# Patient Record
Sex: FEMALE | Race: OTHER | NOT HISPANIC OR LATINO | URBAN - METROPOLITAN AREA
[De-identification: names, ages, dates, MRNs, and addresses within clinical notes are randomized per-mention and may not be internally consistent; named-entity substitution may affect disease eponyms.]

---

## 2019-03-28 ENCOUNTER — OUTPATIENT (OUTPATIENT)
Dept: OUTPATIENT SERVICES | Facility: HOSPITAL | Age: 34
LOS: 1 days | End: 2019-03-28
Payer: COMMERCIAL

## 2019-03-28 VITALS
OXYGEN SATURATION: 100 % | WEIGHT: 271.61 LBS | HEIGHT: 65 IN | HEART RATE: 90 BPM | SYSTOLIC BLOOD PRESSURE: 138 MMHG | RESPIRATION RATE: 16 BRPM | TEMPERATURE: 98 F | DIASTOLIC BLOOD PRESSURE: 79 MMHG

## 2019-03-28 DIAGNOSIS — Z98.890 OTHER SPECIFIED POSTPROCEDURAL STATES: Chronic | ICD-10-CM

## 2019-03-28 DIAGNOSIS — Z01.818 ENCOUNTER FOR OTHER PREPROCEDURAL EXAMINATION: ICD-10-CM

## 2019-03-28 DIAGNOSIS — M77.9 ENTHESOPATHY, UNSPECIFIED: ICD-10-CM

## 2019-03-28 DIAGNOSIS — Z98.84 BARIATRIC SURGERY STATUS: Chronic | ICD-10-CM

## 2019-03-28 DIAGNOSIS — M79.609 PAIN IN UNSPECIFIED LIMB: ICD-10-CM

## 2019-03-28 DIAGNOSIS — Z98.89 OTHER SPECIFIED POSTPROCEDURAL STATES: Chronic | ICD-10-CM

## 2019-03-28 DIAGNOSIS — Z90.49 ACQUIRED ABSENCE OF OTHER SPECIFIED PARTS OF DIGESTIVE TRACT: Chronic | ICD-10-CM

## 2019-03-28 LAB
ANION GAP SERPL CALC-SCNC: 8 MMOL/L — SIGNIFICANT CHANGE UP (ref 5–17)
BUN SERPL-MCNC: 10 MG/DL — SIGNIFICANT CHANGE UP (ref 7–23)
CALCIUM SERPL-MCNC: 9.2 MG/DL — SIGNIFICANT CHANGE UP (ref 8.4–10.5)
CHLORIDE SERPL-SCNC: 102 MMOL/L — SIGNIFICANT CHANGE UP (ref 96–108)
CO2 SERPL-SCNC: 28 MMOL/L — SIGNIFICANT CHANGE UP (ref 22–31)
CREAT SERPL-MCNC: 0.6 MG/DL — SIGNIFICANT CHANGE UP (ref 0.5–1.3)
GLUCOSE SERPL-MCNC: 207 MG/DL — HIGH (ref 70–99)
HCT VFR BLD CALC: 28.7 % — LOW (ref 34.5–45)
HGB BLD-MCNC: 7.8 G/DL — LOW (ref 11.5–15.5)
MCHC RBC-ENTMCNC: 16.2 PG — LOW (ref 27–34)
MCHC RBC-ENTMCNC: 27 GM/DL — LOW (ref 32–36)
MCV RBC AUTO: 59.8 FL — LOW (ref 80–100)
PLATELET # BLD AUTO: 443 K/UL — HIGH (ref 150–400)
POTASSIUM SERPL-MCNC: 3.9 MMOL/L — SIGNIFICANT CHANGE UP (ref 3.5–5.3)
POTASSIUM SERPL-SCNC: 3.9 MMOL/L — SIGNIFICANT CHANGE UP (ref 3.5–5.3)
RBC # BLD: 4.8 M/UL — SIGNIFICANT CHANGE UP (ref 3.8–5.2)
RBC # FLD: 15.4 % — HIGH (ref 10.3–14.5)
SODIUM SERPL-SCNC: 138 MMOL/L — SIGNIFICANT CHANGE UP (ref 135–145)
WBC # BLD: 5.6 K/UL — SIGNIFICANT CHANGE UP (ref 3.8–10.5)
WBC # FLD AUTO: 5.6 K/UL — SIGNIFICANT CHANGE UP (ref 3.8–10.5)

## 2019-03-28 PROCEDURE — G0463: CPT

## 2019-03-28 PROCEDURE — 36415 COLL VENOUS BLD VENIPUNCTURE: CPT

## 2019-03-28 PROCEDURE — 85027 COMPLETE CBC AUTOMATED: CPT

## 2019-03-28 PROCEDURE — 80048 BASIC METABOLIC PNL TOTAL CA: CPT

## 2019-03-28 NOTE — H&P PST ADULT - NSICDXPASTSURGICALHX_GEN_ALL_CORE_FT
PAST SURGICAL HISTORY:  S/P Achilles tendon repair right Dec. 2014    S/P carpal tunnel release right, 2016    S/P carpal tunnel release left, 2017    S/P gastric bypass 2007    S/P laparoscopic cholecystectomy 2006    S/P tonsillectomy 2006    S/P trigger finger release right long finger 2017

## 2019-03-28 NOTE — H&P PST ADULT - NSANTHOSAYNRD_GEN_A_CORE
No. JAZZ screening performed.  STOP BANG Legend: 0-2 = LOW Risk; 3-4 = INTERMEDIATE Risk; 5-8 = HIGH Risk/neck 15.5 inches

## 2019-03-28 NOTE — H&P PST ADULT - NSICDXPROBLEM_GEN_ALL_CORE_FT
PROBLEM DIAGNOSES  Problem: Tendonitis  Assessment and Plan: right wrist dequervain tendon release. patient was told that she needs medical clearance but does not currently have a PCP. She will be finding a doctor who accepts workman's comp. obtain a note from pain management regarding suboxone. pepcid and surgical wash as directed.

## 2019-03-28 NOTE — H&P PST ADULT - RS GEN PE MLT RESP DETAILS PC
no rales/respirations non-labored/good air movement/no rhonchi/airway patent/clear to auscultation bilaterally/no wheezes/breath sounds equal

## 2019-03-28 NOTE — H&P PST ADULT - HISTORY OF PRESENT ILLNESS
34 yo female presents s/p right carpal tunnel release on the right in 2016. She had some relief of numbness and tingling but states that the symptoms have worsened since May 2018. She also states that now she has decreased strength and decreased fine motor coordination. Received a cortisone injection August 2018 with no change in symptoms. c/o pain 4-5/10 on a daily basis and increased to 9-10/10 with prolonged use of the hand. Sees a pain management specialist for other reasons but states that she currently takes no pain medication. She is currently taking suboxone as she had severe withdrawal issues after taking high doses of narcotics in the past.

## 2019-03-28 NOTE — H&P PST ADULT - NSICDXPASTMEDICALHX_GEN_ALL_CORE_FT
PAST MEDICAL HISTORY:  Ankle fracture, right 2014, casted    Ankle pain, right treated with PT, botox injections    Chronic pain right ankle    Hypertension off losartan since 2017    Iron deficiency anemia     Obesity, morbid, BMI 40.0-49.9     PCOS (polycystic ovarian syndrome)     Swelling of ankle, right     Tendonitis dequervains right wrist PAST MEDICAL HISTORY:  Ankle fracture, right 2014, casted    Ankle pain, right treated with PT, botox injections    Chronic pain right ankle    Hypertension off losartan since 2017    Iron deficiency anemia treated with iron infusions until age 26    Obesity, morbid, BMI 40.0-49.9     PCOS (polycystic ovarian syndrome)     Swelling of ankle, right     Tendonitis dequervains right wrist PAST MEDICAL HISTORY:  Ankle fracture, right 2014, casted    Ankle pain, right treated with PT, botox injections    Chronic pain right ankle, took percocet in the past and had severe withdrawal issues and was started on suboxone 2017    Hypertension off losartan since 2017    Iron deficiency anemia treated with iron infusions until age 26    Obesity, morbid, BMI 40.0-49.9     PCOS (polycystic ovarian syndrome)     Swelling of ankle, right     Tendonitis dequervains right wrist

## 2019-03-28 NOTE — H&P PST ADULT - MUSCULOSKELETAL
details… detailed exam no calf tenderness/right wrist, right positive tinel's and positive phalen's signs/decreased ROM/no joint erythema/diminished strength/decreased ROM due to pain/no joint warmth

## 2019-03-28 NOTE — H&P PST ADULT - NSICDXFAMILYHX_GEN_ALL_CORE_FT
FAMILY HISTORY:  Father  Still living? Yes, Estimated age: 61-70  FHx: coronary artery disease, Age at diagnosis: Age Unknown  FHx: hyperlipidemia, Age at diagnosis: Age Unknown  FHx: hypertension, Age at diagnosis: Age Unknown  FHx: myocardial infarction, Age at diagnosis: Age Unknown    Mother  Still living? Yes, Estimated age: 61-70  FHx: arrhythmia, Age at diagnosis: Age Unknown    Sibling  Still living? Yes, Estimated age: 31-40  FHx: hyperlipidemia, Age at diagnosis: Age Unknown  FHx: hypertension, Age at diagnosis: Age Unknown

## 2019-04-09 ENCOUNTER — OUTPATIENT (OUTPATIENT)
Dept: OUTPATIENT SERVICES | Facility: HOSPITAL | Age: 34
LOS: 1 days | End: 2019-04-09
Payer: COMMERCIAL

## 2019-04-09 VITALS
HEART RATE: 79 BPM | TEMPERATURE: 98 F | OXYGEN SATURATION: 96 % | SYSTOLIC BLOOD PRESSURE: 121 MMHG | RESPIRATION RATE: 16 BRPM | DIASTOLIC BLOOD PRESSURE: 73 MMHG

## 2019-04-09 VITALS
WEIGHT: 271.61 LBS | DIASTOLIC BLOOD PRESSURE: 86 MMHG | SYSTOLIC BLOOD PRESSURE: 139 MMHG | TEMPERATURE: 98 F | RESPIRATION RATE: 18 BRPM | HEART RATE: 80 BPM | HEIGHT: 65 IN | OXYGEN SATURATION: 98 %

## 2019-04-09 DIAGNOSIS — M79.609 PAIN IN UNSPECIFIED LIMB: ICD-10-CM

## 2019-04-09 DIAGNOSIS — Z98.890 OTHER SPECIFIED POSTPROCEDURAL STATES: Chronic | ICD-10-CM

## 2019-04-09 DIAGNOSIS — Z98.84 BARIATRIC SURGERY STATUS: Chronic | ICD-10-CM

## 2019-04-09 DIAGNOSIS — Z98.89 OTHER SPECIFIED POSTPROCEDURAL STATES: Chronic | ICD-10-CM

## 2019-04-09 DIAGNOSIS — Z90.49 ACQUIRED ABSENCE OF OTHER SPECIFIED PARTS OF DIGESTIVE TRACT: Chronic | ICD-10-CM

## 2019-04-09 LAB — GLUCOSE BLDC GLUCOMTR-MCNC: 195 MG/DL — HIGH (ref 70–99)

## 2019-04-09 PROCEDURE — 25000 INCISION OF TENDON SHEATH: CPT | Mod: RT

## 2019-04-09 PROCEDURE — 82962 GLUCOSE BLOOD TEST: CPT

## 2019-04-09 RX ORDER — SODIUM CHLORIDE 9 MG/ML
1000 INJECTION, SOLUTION INTRAVENOUS
Qty: 0 | Refills: 0 | Status: DISCONTINUED | OUTPATIENT
Start: 2019-04-09 | End: 2019-04-09

## 2019-04-09 RX ORDER — ZOLPIDEM TARTRATE 10 MG/1
1 TABLET ORAL
Qty: 0 | Refills: 0 | COMMUNITY

## 2019-04-09 RX ORDER — DEXTROAMPHETAMINE SACCHARATE, AMPHETAMINE ASPARTATE, DEXTROAMPHETAMINE SULFATE AND AMPHETAMINE SULFATE 1.875; 1.875; 1.875; 1.875 MG/1; MG/1; MG/1; MG/1
1 TABLET ORAL
Qty: 0 | Refills: 0 | COMMUNITY

## 2019-04-09 RX ORDER — KETOROLAC TROMETHAMINE 30 MG/ML
30 SYRINGE (ML) INJECTION ONCE
Qty: 0 | Refills: 0 | Status: DISCONTINUED | OUTPATIENT
Start: 2019-04-09 | End: 2019-04-09

## 2019-04-09 RX ORDER — BUPRENORPHINE AND NALOXONE 2; .5 MG/1; MG/1
1 TABLET SUBLINGUAL
Qty: 0 | Refills: 0 | COMMUNITY

## 2019-04-09 RX ADMIN — SODIUM CHLORIDE 50 MILLILITER(S): 9 INJECTION, SOLUTION INTRAVENOUS at 11:55

## 2019-04-09 NOTE — ASU DISCHARGE PLAN (ADULT/PEDIATRIC) - CALL YOUR DOCTOR IF YOU HAVE ANY OF THE FOLLOWING:
Pain not relieved by Medications/Unable to urinate/Numbness, tingling, color or temperature change to extremity/Wound/Surgical Site with redness, or foul smelling discharge or pus/Nausea and vomiting that does not stop/Fever greater than (need to indicate Fahrenheit or Celsius)/Bleeding that does not stop/Inability to tolerate liquids or foods

## 2019-04-09 NOTE — ASU PATIENT PROFILE, ADULT - VISION (WITH CORRECTIVE LENSES IF THE PATIENT USUALLY WEARS THEM):
Partially impaired: cannot see medication labels or newsprint, but can see obstacles in path, and the surrounding layout; can count fingers at arm's length Normal vision: sees adequately in most situations; can see medication labels, newsprint/distance and reading glasses

## 2019-04-09 NOTE — BRIEF OPERATIVE NOTE - NSICDXBRIEFPREOP_GEN_ALL_CORE_FT
PRE-OP DIAGNOSIS:  Tendinitis, de Quervain's 09-Apr-2019 16:41:26 right upper extremity Joelle Herrera

## 2019-04-09 NOTE — PROVIDER CONTACT NOTE (OTHER) - RECOMMENDATIONS
None at this time as patient is a pre op. Dr. Magaña stated that he would speak with patient regarding the elevated blood sugar levels

## 2019-04-09 NOTE — ASU PATIENT PROFILE, ADULT - PMH
Ankle fracture, right  2014, casted  Ankle pain, right  treated with PT, botox injections  Chronic pain  right ankle, took percocet in the past and had severe withdrawal issues and was started on suboxone 2017  Hypertension  off losartan since 2017  Iron deficiency anemia  treated with iron infusions until age 26  Obesity, morbid, BMI 40.0-49.9    PCOS (polycystic ovarian syndrome)    Swelling of ankle, right    Tendonitis  dequervains right wrist

## 2019-04-09 NOTE — ASU DISCHARGE PLAN (ADULT/PEDIATRIC) - ASU DC SPECIAL INSTRUCTIONSFT
Elevate right upper extremity above level of heart to diminish swelling/discomfort.  Follow-up Dr. Moctezuma on Monday 4/15/19; call office for appointment time.  Tylenol 325mg 2 tabs by mouth every 6 hours if needed for discomfort.  Ibuprofen 200mg 1-2 tabs by mouth every 6 hours if needed for pain (take with food)

## 2019-04-09 NOTE — ASU DISCHARGE PLAN (ADULT/PEDIATRIC) - CARE PROVIDER_API CALL
Gabino Moctezuma)  Plastic Surgery; Surgery  [address], [address]  [place] [address]  Phone: [phone]  Fax: [phone]  Follow Up Time:

## 2019-04-09 NOTE — BRIEF OPERATIVE NOTE - NSICDXBRIEFPOSTOP_GEN_ALL_CORE_FT
POST-OP DIAGNOSIS:  Tendinitis, de Quervain's 09-Apr-2019 16:41:43 right upper extremity Joelle Herrera

## 2020-10-12 ENCOUNTER — EMERGENCY (EMERGENCY)
Facility: HOSPITAL | Age: 35
LOS: 1 days | Discharge: HOME | End: 2020-10-12
Attending: EMERGENCY MEDICINE | Admitting: EMERGENCY MEDICINE
Payer: MEDICARE

## 2020-10-12 VITALS
HEIGHT: 65 IN | OXYGEN SATURATION: 100 % | RESPIRATION RATE: 16 BRPM | TEMPERATURE: 98 F | SYSTOLIC BLOOD PRESSURE: 152 MMHG | DIASTOLIC BLOOD PRESSURE: 81 MMHG | HEART RATE: 95 BPM

## 2020-10-12 DIAGNOSIS — Z98.89 OTHER SPECIFIED POSTPROCEDURAL STATES: Chronic | ICD-10-CM

## 2020-10-12 DIAGNOSIS — Z98.890 OTHER SPECIFIED POSTPROCEDURAL STATES: Chronic | ICD-10-CM

## 2020-10-12 DIAGNOSIS — Z90.49 ACQUIRED ABSENCE OF OTHER SPECIFIED PARTS OF DIGESTIVE TRACT: Chronic | ICD-10-CM

## 2020-10-12 DIAGNOSIS — Z98.84 BARIATRIC SURGERY STATUS: Chronic | ICD-10-CM

## 2020-10-12 PROBLEM — M25.571 PAIN IN RIGHT ANKLE AND JOINTS OF RIGHT FOOT: Chronic | Status: ACTIVE | Noted: 2019-03-28

## 2020-10-12 PROBLEM — G89.29 OTHER CHRONIC PAIN: Chronic | Status: ACTIVE | Noted: 2019-03-28

## 2020-10-12 PROBLEM — S82.891A OTHER FRACTURE OF RIGHT LOWER LEG, INITIAL ENCOUNTER FOR CLOSED FRACTURE: Chronic | Status: ACTIVE | Noted: 2019-03-28

## 2020-10-12 PROBLEM — M77.9 ENTHESOPATHY, UNSPECIFIED: Chronic | Status: ACTIVE | Noted: 2019-03-28

## 2020-10-12 PROBLEM — M25.471 EFFUSION, RIGHT ANKLE: Chronic | Status: ACTIVE | Noted: 2019-03-28

## 2020-10-12 PROBLEM — E66.01 MORBID (SEVERE) OBESITY DUE TO EXCESS CALORIES: Chronic | Status: ACTIVE | Noted: 2019-03-28

## 2020-10-12 LAB — SARS-COV-2 RNA SPEC QL NAA+PROBE: SIGNIFICANT CHANGE UP

## 2020-10-12 PROCEDURE — 99283 EMERGENCY DEPT VISIT LOW MDM: CPT

## 2020-10-12 NOTE — ED PROVIDER NOTE - CLINICAL SUMMARY MEDICAL DECISION MAKING FREE TEXT BOX
33 y/o female with no pmhx presents to ED for covid test. 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 Pt denies any exposure or symptoms. plan for covid swab and discharge.

## 2020-10-12 NOTE — ED PROVIDER NOTE - PATIENT PORTAL LINK FT
You can access the FollowMyHealth Patient Portal offered by [facility] by registering at the following website: [email or web address]. By joining [facility]’s FollowMyHealth portal, you will also be able to view your health information using other applications (apps) compatible with our system.

## 2020-10-12 NOTE — ED PROVIDER NOTE - ATTENDING CONTRIBUTION TO CARE
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 Denies known contacts, cough, fever, nausea, vomiting, diarrhea.     VS noted  Gen. no acute distress, Non toxic   HEENT: EOMI, mmm  Lungs: CTAB/L no C/ W /R   CVS: RRR   Abd; Soft non tender, non distended   Ext: no edema  Skin: no rash  Neuro AAOx3 non focal clear speech  a/p: r/o COVID19 - plan for COVID19 test and d/c  - Melissa ORDONEZ

## 2020-10-12 NOTE — ED PROVIDER NOTE - OBJECTIVE STATEMENT
33 y/o female with no pmhx presents to ED for covid test. 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 Pt denies any exposure or symptoms. No fever, chills, cough, sob, abd pain, n/v, weakness, body aches.

## 2020-10-12 NOTE — ED PROVIDER NOTE - PSH
S/P Achilles tendon repair  right Dec. 2014  S/P carpal tunnel release  left, 2017  S/P carpal tunnel release  right, 2016  S/P gastric bypass  2007  S/P laparoscopic cholecystectomy  2006  S/P tonsillectomy  2006  S/P trigger finger release  right long finger 2017

## 2021-10-31 NOTE — H&P PST ADULT - SURGICAL SITE INCISION
Patient States her rolled his ankle 4 days ago and was previously evaluated at urgent care were he state a x ray was done and he had no breaks. Patient took 800 motrin at 0200.  Patient ambulated with assistance from crutches       Cannon Olszewski, RN  10/31/21 [id number]
no

## 2022-04-28 NOTE — ED PROVIDER NOTE - NS ED MD DISPO DISCHARGE
Assignments/Recommendations: Continue follow-up with SW as needed. Review and practice coping/craving management skills, attend [facility] support group. Follow up with present providers and all scheduled appointment at [facility].
Home

## 2024-05-19 NOTE — ASU PATIENT PROFILE, ADULT - PSH
No
S/P Achilles tendon repair  right Dec. 2014  S/P carpal tunnel release  left, 2017  S/P carpal tunnel release  right, 2016  S/P gastric bypass  2007  S/P laparoscopic cholecystectomy  2006  S/P tonsillectomy  2006  S/P trigger finger release  right long finger 2017